# Patient Record
Sex: MALE | Race: WHITE | NOT HISPANIC OR LATINO | URBAN - METROPOLITAN AREA
[De-identification: names, ages, dates, MRNs, and addresses within clinical notes are randomized per-mention and may not be internally consistent; named-entity substitution may affect disease eponyms.]

---

## 2017-03-21 ENCOUNTER — EMERGENCY (EMERGENCY)
Facility: HOSPITAL | Age: 27
LOS: 1 days | Discharge: HOME | End: 2017-03-21
Attending: EMERGENCY MEDICINE | Admitting: EMERGENCY MEDICINE
Payer: COMMERCIAL

## 2017-03-21 VITALS
SYSTOLIC BLOOD PRESSURE: 141 MMHG | OXYGEN SATURATION: 97 % | TEMPERATURE: 98 F | WEIGHT: 179.9 LBS | RESPIRATION RATE: 16 BRPM | HEART RATE: 57 BPM | DIASTOLIC BLOOD PRESSURE: 54 MMHG

## 2017-03-21 VITALS
TEMPERATURE: 98 F | RESPIRATION RATE: 16 BRPM | HEART RATE: 58 BPM | DIASTOLIC BLOOD PRESSURE: 78 MMHG | OXYGEN SATURATION: 99 % | SYSTOLIC BLOOD PRESSURE: 133 MMHG

## 2017-03-21 DIAGNOSIS — N48.89 OTHER SPECIFIED DISORDERS OF PENIS: ICD-10-CM

## 2017-03-21 DIAGNOSIS — N47.2 PARAPHIMOSIS: ICD-10-CM

## 2017-03-21 PROCEDURE — 99282 EMERGENCY DEPT VISIT SF MDM: CPT

## 2017-03-21 NOTE — ED PROVIDER NOTE - OBJECTIVE STATEMENT
25 yo uncircumcised male c/o painful swelling of foreskin. Pt had intercourse 2 night ago around midnight; woke at 12pm yest and noted swelling at foreskin and exposed gland of penis.  Pt tried to move foreskin down and could not.  Pt noted sx slightly better this am but then worsened during the day.  Pt went to urgent care and sent to ed for rx.  Pt denies dysuria, penile discharge, h/o std.  Monogamous, partner w/o std sx.  No testicular pain.  No h/o similar issue.

## 2017-03-21 NOTE — ED ADULT NURSE NOTE - CHPI ED SYMPTOMS NEG
no diarrhea/no nausea/no dysuria/no chills/no blood in stool/no vomiting/no fever/no abdominal distension/no burning urination/no hematuria

## 2018-05-12 NOTE — ED ADULT NURSE NOTE - NO SIGNIFICANT PAST SURGICAL HISTORY
Problem: Patient Care Overview  Goal: Plan of Care Review  Outcome: Ongoing (interventions implemented as appropriate)  R groin site remains CDI, no bleeding or hematoma noted.  Pt ambulated in hallway and in room.  Tolerated well.  Will cont to monitor.         <<----- Click to add NO significant Past Surgical History

## 2019-10-17 NOTE — ED PROVIDER NOTE - CROS ED ROS STATEMENT
Have You Had Microdermabrasion Before?: has not had previous dermabrasion
When Outside In The Sun, Do You...: mostly tans, rarely burns
all other ROS negative except as per HPI